# Patient Record
Sex: FEMALE | URBAN - METROPOLITAN AREA
[De-identification: names, ages, dates, MRNs, and addresses within clinical notes are randomized per-mention and may not be internally consistent; named-entity substitution may affect disease eponyms.]

---

## 2020-03-28 ENCOUNTER — HOSPITAL ENCOUNTER (OUTPATIENT)
Age: 60
Setting detail: SPECIMEN
Discharge: HOME | End: 2020-03-28
Payer: COMMERCIAL

## 2020-03-28 ENCOUNTER — OFFICE VISIT (OUTPATIENT)
Dept: PRIMARY CARE CLINIC | Age: 60
End: 2020-03-28
Payer: COMMERCIAL

## 2020-03-28 VITALS
HEIGHT: 65 IN | BODY MASS INDEX: 28.32 KG/M2 | WEIGHT: 170 LBS | DIASTOLIC BLOOD PRESSURE: 86 MMHG | OXYGEN SATURATION: 95 % | SYSTOLIC BLOOD PRESSURE: 147 MMHG | HEART RATE: 113 BPM | TEMPERATURE: 99.2 F

## 2020-03-28 LAB
INFLUENZA A ANTIBODY: NEGATIVE
INFLUENZA B ANTIBODY: NEGATIVE

## 2020-03-28 PROCEDURE — 99213 OFFICE O/P EST LOW 20 MIN: CPT | Performed by: FAMILY MEDICINE

## 2020-03-28 PROCEDURE — 87804 INFLUENZA ASSAY W/OPTIC: CPT | Performed by: FAMILY MEDICINE

## 2020-03-28 RX ORDER — GABAPENTIN 300 MG/1
300 CAPSULE ORAL 2 TIMES DAILY
COMMUNITY
Start: 2018-03-27 | End: 2020-03-28 | Stop reason: ALTCHOICE

## 2020-03-28 RX ORDER — SIMVASTATIN 20 MG
20 TABLET ORAL NIGHTLY
COMMUNITY
Start: 2018-06-01

## 2020-03-28 RX ORDER — UBIDECARENONE 100 MG
CAPSULE ORAL
COMMUNITY

## 2020-03-28 RX ORDER — FLUTICASONE PROPIONATE 50 MCG
SPRAY, SUSPENSION (ML) NASAL
COMMUNITY
Start: 2018-03-23

## 2020-03-28 RX ORDER — OMEPRAZOLE 10 MG/1
10 CAPSULE, DELAYED RELEASE ORAL DAILY
COMMUNITY

## 2020-03-28 ASSESSMENT — ENCOUNTER SYMPTOMS
ABDOMINAL PAIN: 1
SINUS PAIN: 1
SINUS PRESSURE: 1
SORE THROAT: 1
RHINORRHEA: 0

## 2020-03-28 NOTE — PROGRESS NOTES
[facility]  [facility]  [address] [place]  [address] [address]  Dept: [phone]  Dept Fax: [phone]    Hai Sanabria is a 62 y.o. female who presents today for her medical conditions/complaints as noted below. Hai Sanabria is c/o of   Chief Complaint   Patient presents with    Generalized Body Aches    Chills    Headache         HPI:     2 days ago the patient felt very tired, run down and having myalgias. She also complains of fevers and chills. The symptoms continued and she ezra had to take a nap between patients and felt really tired. The patient does have a history of acid reflux and states the symptoms are usually chest pressure. She currently does note that sensation of feeling short of breath and having some chest pressure. She states in the past when she's had chest pressure it was worked up for cardiac origin and the cardiac testing came back negative. She describes this sickness as feeling like she was \"hit by a truck\". She does complain of intermittent elevation of her heart rate. Other symptoms include throat feeling swollen, ear pressure    The patient denies any cough, ear pain, sputum production. No results found for: LABA1C          ( goal A1Cis < 7)   No results found for: LABMICR  No results found for: LDLCHOLESTEROL, LDLCALC    (goal LDL is <100)   No results found for: AST, ALT, BUN  BP Readings from Last 3 Encounters:   03/28/20 (!) 147/86          (goal 120/80)    No past medical history on file. No past surgical history on file. No family history on file.     Social History     Tobacco Use    Smoking status: Never Smoker    Smokeless tobacco: Never Used   Substance Use Topics    Alcohol use: Not on file      Current Outpatient Medications   Medication Sig Dispense Refill    fluticasone (FLONASE) 50 MCG/ACT nasal spray by Nasal route      Meloxicam (MOBIC PO) Take 15 mg by mouth      simvastatin (ZOCOR) work note. Patient was advised to go to ER if she develops any worsening shortness of breath or chest pressure. Chest pressure-d/w patient the possibility that her symptoms could be related to cardiac pathology. Patient declines that being a possibiity. I still advised patient that she should go to ER if the chest pressure returns or she develops shortness of breath. The patient verbalized understanding and agreed to go to ER if pressure returns  Return if symptoms worsen or fail to improve. Orders Placed This Encounter   Procedures    COVID-19     Standing Status:   Future     Standing Expiration Date:   3/28/2021     Scheduling Instructions:      Use this test only if unable to order EMERGENT DISEASE PANEL/patient does not meet PUI criteria BUT patient is at possible risk for COVID-19. Due to limited availability of the test, tests may be prioritized and may not be available or completed. 1) Nasopharyngeal swab* AND oropharyngeal swab. (Place both swabs in one collection tube). Also acceptable: nasopharyngeal swab. 2) Print and instruct patient to adhere to [facility] home isolation program. (Link Above)            3) Set up or refer patient for a monitoring program.            4) Have patient sign up for and leverage [employer] (if not previously done).  POCT Influenza A/B     No orders of the defined types were placed in this encounter. Patient given educational materials - see patient instructions. Discussed use, benefit, and side effects of prescribed medications. All patientquestions answered. Pt voiced understanding. Reviewed health maintenance. Instructedto continue current medications, diet and exercise. Patient agreed with treatmentplan. Follow up as directed.      Electronically signed by Neal Diaz MD on 3/29/2020 at 8:21 AM

## 2020-03-28 NOTE — PATIENT INSTRUCTIONS
Preventing the Spread of Coronavirus Disease 2019 in Homes and Residential Communities   For the most recent information go to [email or web address]    Prevention steps for People with confirmed or suspected COVID-19 (including persons under investigation) who do not need to be hospitalized  and   People with confirmed COVID-19 who were hospitalized and determined to be medically stable to go home    Your healthcare provider and public health staff will evaluate whether you can be cared for at home. If it is determined that you do not need to be hospitalized and can be isolated at home, you will be monitored by staff from your local or state health department. You should follow the prevention steps below until a healthcare provider or local or state health department says you can return to your normal activities. Stay home except to get medical care  People who are mildly ill with COVID-19 are able to isolate at home during their illness. You should restrict activities outside your home, except for getting medical care. Do not go to work, school, or public areas. Avoid using public transportation, ride-sharing, or taxis. Separate yourself from other people and animals in your home  People: As much as possible, you should stay in a specific room and away from other people in your home. Also, you should use a separate bathroom, if available. Animals: You should restrict contact with pets and other animals while you are sick with COVID-19, just like you would around other people. Although there have not been reports of pets or other animals becoming sick with COVID-19, it is still recommended that people sick with COVID-19 limit contact with animals until more information is known about the virus. When possible, have another member of your household care for your animals while you are sick.  If you are sick with COVID-19, avoid contact with your pet, including

## 2020-03-29 ASSESSMENT — ENCOUNTER SYMPTOMS: SHORTNESS OF BREATH: 1

## 2020-04-05 LAB — SARS-COV-2, NAA: NOT DETECTED

## 2024-12-17 ENCOUNTER — TELEPHONE (OUTPATIENT)
Age: 64
End: 2024-12-17

## 2024-12-17 NOTE — TELEPHONE ENCOUNTER
12/17/2024 - left voicemail message for patient to call back and schedule a new patient appointment with either provider (cervical disc disease, left arm weakness, lumbar radiculopathy, right sided low back pain with right sided sciatica) MRI Lumbar / Cervical 11/27/24 @ [facility] - referral in folder

## 2025-02-10 ENCOUNTER — OFFICE VISIT (OUTPATIENT)
Age: 65
End: 2025-02-10
Payer: MEDICARE

## 2025-02-10 VITALS
HEIGHT: 65 IN | HEART RATE: 113 BPM | BODY MASS INDEX: 26.66 KG/M2 | WEIGHT: 160 LBS | SYSTOLIC BLOOD PRESSURE: 125 MMHG | DIASTOLIC BLOOD PRESSURE: 87 MMHG

## 2025-02-10 DIAGNOSIS — G56.03 BILATERAL CARPAL TUNNEL SYNDROME: ICD-10-CM

## 2025-02-10 DIAGNOSIS — M48.02 FORAMINAL STENOSIS OF CERVICAL REGION: Primary | ICD-10-CM

## 2025-02-10 PROCEDURE — 1123F ACP DISCUSS/DSCN MKR DOCD: CPT | Performed by: NEUROLOGICAL SURGERY

## 2025-02-10 PROCEDURE — G8427 DOCREV CUR MEDS BY ELIG CLIN: HCPCS | Performed by: NEUROLOGICAL SURGERY

## 2025-02-10 PROCEDURE — G8419 CALC BMI OUT NRM PARAM NOF/U: HCPCS | Performed by: NEUROLOGICAL SURGERY

## 2025-02-10 PROCEDURE — 1036F TOBACCO NON-USER: CPT | Performed by: NEUROLOGICAL SURGERY

## 2025-02-10 PROCEDURE — 99204 OFFICE O/P NEW MOD 45 MIN: CPT | Performed by: NEUROLOGICAL SURGERY

## 2025-02-10 PROCEDURE — 3017F COLORECTAL CA SCREEN DOC REV: CPT | Performed by: NEUROLOGICAL SURGERY

## 2025-02-10 PROCEDURE — 1090F PRES/ABSN URINE INCON ASSESS: CPT | Performed by: NEUROLOGICAL SURGERY

## 2025-02-10 PROCEDURE — 1159F MED LIST DOCD IN RCRD: CPT | Performed by: NEUROLOGICAL SURGERY

## 2025-02-10 PROCEDURE — G8400 PT W/DXA NO RESULTS DOC: HCPCS | Performed by: NEUROLOGICAL SURGERY

## 2025-02-10 RX ORDER — PREGABALIN 25 MG/1
75 CAPSULE ORAL 2 TIMES DAILY
COMMUNITY

## 2025-02-10 RX ORDER — DULOXETIN HYDROCHLORIDE 30 MG/1
30 CAPSULE, DELAYED RELEASE ORAL DAILY
COMMUNITY

## 2025-02-10 NOTE — PROGRESS NOTES
physical therapy have been helpful.  Thus for the time being she would like to maintain a conservative stance.  If her symptoms were to worsen or recrudesce, we could then give further consideration to additional treatment including the option of surgical invention at that time.  For the time being, the patient wished to maintain a conservative stance regarding her neck and carpal tunnel syndrome.    Followup: No follow-ups on file.    Prescriptions Ordered:  No orders of the defined types were placed in this encounter.       Orders Placed:  No orders of the defined types were placed in this encounter.      Electronically signed by Johny Tsang MD on 2/10/2025 at 8:44 AM    Please note that this chart was generated using voice recognition Dragon dictation software.  Although every effort was made to ensure the accuracy of this automated transcription, some errors in transcription may have occurred.

## 2025-02-12 ENCOUNTER — OFFICE VISIT (OUTPATIENT)
Age: 65
End: 2025-02-12
Payer: MEDICARE

## 2025-02-12 VITALS
BODY MASS INDEX: 26.66 KG/M2 | WEIGHT: 160 LBS | DIASTOLIC BLOOD PRESSURE: 84 MMHG | SYSTOLIC BLOOD PRESSURE: 130 MMHG | HEART RATE: 85 BPM | HEIGHT: 65 IN | RESPIRATION RATE: 14 BRPM

## 2025-02-12 DIAGNOSIS — M48.061 FORAMINAL STENOSIS OF LUMBAR REGION: Primary | ICD-10-CM

## 2025-02-12 DIAGNOSIS — M41.26 OTHER IDIOPATHIC SCOLIOSIS, LUMBAR REGION: ICD-10-CM

## 2025-02-12 DIAGNOSIS — M51.362 DEGENERATION OF INTERVERTEBRAL DISC OF LUMBAR REGION WITH DISCOGENIC BACK PAIN AND LOWER EXTREMITY PAIN: ICD-10-CM

## 2025-02-12 PROCEDURE — G8427 DOCREV CUR MEDS BY ELIG CLIN: HCPCS | Performed by: NEUROLOGICAL SURGERY

## 2025-02-12 PROCEDURE — 1090F PRES/ABSN URINE INCON ASSESS: CPT | Performed by: NEUROLOGICAL SURGERY

## 2025-02-12 PROCEDURE — 1036F TOBACCO NON-USER: CPT | Performed by: NEUROLOGICAL SURGERY

## 2025-02-12 PROCEDURE — G8419 CALC BMI OUT NRM PARAM NOF/U: HCPCS | Performed by: NEUROLOGICAL SURGERY

## 2025-02-12 PROCEDURE — 1159F MED LIST DOCD IN RCRD: CPT | Performed by: NEUROLOGICAL SURGERY

## 2025-02-12 PROCEDURE — 3017F COLORECTAL CA SCREEN DOC REV: CPT | Performed by: NEUROLOGICAL SURGERY

## 2025-02-12 PROCEDURE — G8400 PT W/DXA NO RESULTS DOC: HCPCS | Performed by: NEUROLOGICAL SURGERY

## 2025-02-12 PROCEDURE — 1123F ACP DISCUSS/DSCN MKR DOCD: CPT | Performed by: NEUROLOGICAL SURGERY

## 2025-02-12 PROCEDURE — 99214 OFFICE O/P EST MOD 30 MIN: CPT | Performed by: NEUROLOGICAL SURGERY

## 2025-02-12 NOTE — PROGRESS NOTES
[facility], [facility], [facility] NEUROSURGERY  [address], [address]  [facility] [place] [address]  Dept: [phone]  Dept Fax: [phone]     Patient:  Elayne Carey  YOB: 1960  Date: 2/12/25      Chief Complaint   Patient presents with    Follow-up     lumbar radiculopathy, right sided low back pain with right sided sciatica   MRI Lumbar 11/27/24 @ [facility] - patient seen on 2/10/25 for cervical              HPI:     Patient is a 64-year-old female who was recently seen in my office regarding her cervical spine.  She presents back to my office today with ongoing complaints of back as well as right lower extremity discomfort.  The patient indicates that her pain originates in her low back coursing into the right buttock and hip extending into both the anterior as well as lateral aspect of her thigh and calf region ending and intermittent numbness tingling of her right foot.  She does not describe any symptoms in left lower extremity.  Her symptoms have been quite prominent over the past 2 to 3 months.  She does not describe any difficulty with her bowel or bladder function.  She did recently undergo a injection through pain management to her lower back which she indicates has improved her current condition approximate 50%.    Examination today demonstrates a well-healed midline lumbar incision.  She has no reproducible discomfort to palpation across the lumbar spine or adjacent musculature.  Hips are nontender.  Danny's testing is negative bilaterally.  Strength and sensation are normal throughout both lower extremities.  Straight leg raising is -9 degrees.  Gait is steady.    A recent MRI of the lumbar spine performed November 27, 2024 is reviewed.  This study is most notable for marked degenerative change with loss of disc space height at the L3-4, L4-5 and L5-S1 to space levels.  Patient does have an associated scoliotic

## 2025-02-13 ENCOUNTER — TELEPHONE (OUTPATIENT)
Age: 65
End: 2025-02-13

## 2025-02-13 DIAGNOSIS — M41.26 OTHER IDIOPATHIC SCOLIOSIS, LUMBAR REGION: Primary | ICD-10-CM

## 2025-02-13 NOTE — TELEPHONE ENCOUNTER
Patient seen 2/12/25 and Dr Tsang wants to refer her to Dr Morton. Can you please add the referral and I will fax it to his office. Thanks

## 2025-05-23 ENCOUNTER — HOSPITAL ENCOUNTER (OUTPATIENT)
Age: 65
Setting detail: OUTPATIENT SURGERY
Discharge: HOME | End: 2025-05-23
Attending: ORTHOPAEDIC SURGERY | Admitting: ORTHOPAEDIC SURGERY
Payer: MEDICARE

## 2025-05-23 VITALS
TEMPERATURE: 98 F | OXYGEN SATURATION: 97 % | DIASTOLIC BLOOD PRESSURE: 78 MMHG | RESPIRATION RATE: 13 BRPM | HEART RATE: 60 BPM | WEIGHT: 169 LBS | BODY MASS INDEX: 28.85 KG/M2 | HEIGHT: 64 IN | SYSTOLIC BLOOD PRESSURE: 149 MMHG

## 2025-05-23 DIAGNOSIS — G89.18 POST-OP PAIN: Primary | ICD-10-CM

## 2025-05-23 PROCEDURE — 2500000003 HC RX 250 WO HCPCS: Performed by: ORTHOPAEDIC SURGERY

## 2025-05-23 PROCEDURE — 2709999900 HC NON-CHARGEABLE SUPPLY: Performed by: ORTHOPAEDIC SURGERY

## 2025-05-23 PROCEDURE — 7100000010 HC PHASE II RECOVERY - FIRST 15 MIN: Performed by: ORTHOPAEDIC SURGERY

## 2025-05-23 PROCEDURE — 3600000012 HC SURGERY LEVEL 2 ADDTL 15MIN: Performed by: ORTHOPAEDIC SURGERY

## 2025-05-23 PROCEDURE — 3600000002 HC SURGERY LEVEL 2 BASE: Performed by: ORTHOPAEDIC SURGERY

## 2025-05-23 RX ORDER — BUPIVACAINE HYDROCHLORIDE AND EPINEPHRINE 5; 5 MG/ML; UG/ML
INJECTION, SOLUTION PERINEURAL
Status: DISCONTINUED
Start: 2025-05-23 | End: 2025-05-23 | Stop reason: HOSPADM

## 2025-05-23 RX ORDER — TRAMADOL HYDROCHLORIDE 50 MG/1
50 TABLET ORAL EVERY 6 HOURS PRN
Qty: 28 TABLET | Refills: 0 | Status: SHIPPED | OUTPATIENT
Start: 2025-05-23 | End: 2025-05-30

## 2025-05-23 RX ORDER — BUPIVACAINE HCL/EPINEPHRINE 0.25-.0005
VIAL (ML) INJECTION PRN
Status: DISCONTINUED | OUTPATIENT
Start: 2025-05-23 | End: 2025-05-23 | Stop reason: ALTCHOICE

## 2025-05-23 ASSESSMENT — PAIN - FUNCTIONAL ASSESSMENT
PAIN_FUNCTIONAL_ASSESSMENT: NONE - DENIES PAIN
PAIN_FUNCTIONAL_ASSESSMENT: 0-10

## 2025-05-23 NOTE — H&P
Surgical History and Physical Exam    Reason for surgery:  The patient is a 64 y.o. female presenting for left carpal tunnel release.    Past Medical History:    Past Medical History:   Diagnosis Date    GERD (gastroesophageal reflux disease)     Hyperlipidemia      Past Surgical History:    Past Surgical History:   Procedure Laterality Date    LAMINECTOMY AND MICRODISCECTOMY LUMBAR SPINE  2018     Medications Prior to Admission:   Prior to Admission medications    Medication Sig Start Date End Date Taking? Authorizing Provider   DULoxetine (CYMBALTA) 30 MG extended release capsule Take 1 capsule by mouth daily   Yes Evangelina Chavis MD   pregabalin (LYRICA) 25 MG capsule Take 3 capsules by mouth 2 times daily.   Yes Evangelina Chavis MD   simvastatin (ZOCOR) 20 MG tablet 1 tablet nightly 6/1/18  Yes Evangelina Chavis MD   Multiple Vitamins-Minerals (MULTIVITAL PO) Take by mouth   Yes Evangelina Chavis MD   vitamin D (D3-1000) 25 MCG (1000 UT) CAPS Take by mouth   Yes Evangelina Chavis MD   omeprazole (PRILOSEC) 10 MG delayed release capsule Take 1 capsule by mouth daily   Yes Evangelina Chavis MD   Acetaminophen (TYLENOL 8 HOUR PO) Take by mouth   Yes Evangelina Chavis MD   Omega-3 Fatty Acids (FISH OIL PO) Take by mouth   Yes Evangelina Chavis MD   fluticasone (FLONASE) 50 MCG/ACT nasal spray by Nasal route 3/23/18   Evangelina Chavis MD   Meloxicam (MOBIC PO) Take 15 mg by mouth 3/23/18   Evangelina Chavis MD     Allergies:    Patient has no known allergies.  Social History:   Social History     Socioeconomic History    Marital status: Unknown     Spouse name: None    Number of children: None    Years of education: None    Highest education level: None   Tobacco Use    Smoking status: Never    Smokeless tobacco: Never   Substance and Sexual Activity    Alcohol use: Yes     Comment: socially    Drug use: Never     Social Drivers of Health     Financial Resource Strain: Low  history.    REVIEW OF SYSTEMS:  Constitutional: Negative for fever and chills.   HENT: Negative for congestion or drainage   Eyes: Negative for blurred vision and double vision.   Respiratory: Negative for cough, shortness of breath and wheezing.   Cardiovascular: Negative for chest pain and palpitations.   Gastrointestinal: Negative for nausea. Negative for vomiting.   Musculoskeletal: Positive for myalgias and joint pain.   Skin: Negative for itching and rash.   Neurological: Negative for dizziness, sensory change and headaches.   Psychiatric/Behavioral: Negative for depression and suicidal ideas.      PHYSICAL EXAM:  Blood pressure (!) 140/87, pulse 69, temperature 97.9 °F (36.6 °C), temperature source Temporal, resp. rate 14, height 1.626 m (5' 4\"), weight 76.7 kg (169 lb), SpO2 100%.  Gen: alert and oriented, NAD, cooperative  Head: normocephalic atraumatic   Cardiovascular: Regular rate, no dependent edema, distal pulses 2+  Respiratory: Chest symmetric, no accessory muscle use, normal respirations  MSK: motor and  sensation grossly intact    A/P: 64 y.o. female  was evaluated and after discussion surgical and non surgical options, the patient has decided to undergo The abovementioned. Consent obtained and in chart. All questions answered appropriately. Surgical risks including but not limited to: bleeding, blood clots, infection, damage to surrounding tissues/nerves/vessels, failure of fixation, failure of wounds to heal, loss of motion, stiffness, dislocation, postoperative pain, recurrence of symptoms, need for future surgery, risks of anesthesia, loss of limb and loss of life were all discussed with the patient. Knowing these risks, the patient wishes to proceed with surgery.   -Abx OCTOR  -Site marked, Consent in chart  -AC held  -NPO since midnight  -All question answered.    ________________________________  Camilo Ward D.O.  Orthopedic Surgery Resident, PGY-3  [facility]

## 2025-05-23 NOTE — OP NOTE
[address] [place] [address]                            OPERATIVE REPORT      PATIENT NAME: YSABEL LONG                : 1960  MED REC NO: [id number]                         ROOM: [facility]  ACCOUNT NO: [id number]                       ADMIT DATE: 2025  PROVIDER: Nicholas Morton MD      DATE OF PROCEDURE:  2025    SURGEON:  Nicholas Morton MD    ASSISTANT:  Camilo Ward D.O.    PREOPERATIVE DIAGNOSIS:  Left carpal tunnel syndrome.    POSTOPERATIVE DIAGNOSIS:  Left carpal tunnel syndrome.    PROCEDURE:  Left carpal tunnel release.    ANESTHESIA:  Local.    INDICATIONS:  The patient is a 64-year-old lady, numbness, tingling, pain across the left wrist, hand and fingers, with dysesthesia consistent with carpal tunnel syndrome.  She had failed a nonoperative treatment program and presents for surgical treatment.  The surgical procedure, risks, benefits, and complications were discussed with good comprehension and informed consent obtained.    DESCRIPTION OF PROCEDURE:  The patient was brought into the operating room, placed under appropriate general anesthesia, and transferred to operating room table in the supine position, arm at her side, and pneumatic tourniquet applied to the left upper arm but not inflated.  The hand was prepped with chlorhexidine, and a time-out performed.    Local anesthetic infiltrated along the left wrist, along the carpal tunnel to the ulnar side.  Once satisfactory level of anesthesia was obtained, we then made a scalpel incision, directly along the ring finger digit, along the skin crease.  Dissected down through the skin and subcutaneous tissues, palmar fascia opened, and exposed the transverse carpal ligament.  Once we had that exposed, we then released that sharply using loupe magnification, and then proximal and distal, used small tenotomies to open up further.  We  dissected the above volar to the transverse carpal ligament to the pump to make sure there were no crossing branches, and once identified clear, we then released it.    Bipolar cautery used for some small little subcutaneous bleeders.  Once that was done, wound was irrigated.  Nerve was now well decompressed.    We then closed the skin using 4-0 Prolene sutures, and combination of simple and horizontal mattress suture technique.  Once that was secured, we then placed our dressing across it, padding applied and secured with an Ace.    The patient was then transferred to the bed, awoke, and brought to recovery room in satisfactory condition.          NICHOLAS MORTON MD      D:  05/23/2025 14:27:07     T:  05/23/2025 15:03:08     TGA/PAIGES  Job #:  [id number]     Doc#:  [id number]    CC:   Nicholas Morton MD

## 2025-05-23 NOTE — BRIEF OP NOTE
Brief Postoperative Note      Patient: Elayne Carey  YOB: 1960  MRN: [id number]    Date of Procedure: 5/23/2025    Pre-Op Diagnosis Codes:      * Left carpal tunnel syndrome [G56.02]    Post-Op Diagnosis: Same       Procedure(s):  LEFT CARPAL TUNNEL RELEASE    Surgeon(s):  Nicholas Morton MD    Assistant:  Resident: Camilo Ward DO    Anesthesia: Local    Estimated Blood Loss (mL): Minimal    Complications: None    Specimens:   * No specimens in log *    Implants:  * No implants in log *      Drains: * No LDAs found *    Findings:  Infection Present At Time Of Surgery (PATOS) (choose all levels that have infection present):  No infection present  Other Findings: Left carpal tunnel syndrome; see op note    Electronically signed by Camilo Ward DO on 5/23/2025 at 2:12 PM

## 2025-05-23 NOTE — DISCHARGE INSTRUCTIONS
Leave the surgical bandage in place for 2 days.  Then you can remove it and shower and replace with a new bandage or a large Band-Aid each day.  It is okay and encouraged to move the fingers throughout the day.  It is okay to use that hand for activities of daily living.  Avoid heavy gripping or lifting until seen in the office.    Call your doctor now or seek immediate medical care if:     You have pain that does not get better after you take pain medicine.   You have a fever over 101°F.   You have chills   You have signs of infection, such as:   Increased pain, swelling, warmth, or redness.   Red streaks leading from the incision.   Pus draining from the incision.     -Ice (20 minutes on and off 1 hour) as needed for swelling/pain.  -Drink plenty of fluids.  -Follow up with Dr. Morton in his office in 10-14 days after surgery/discharge. Call [phone] to schedule.

## 2025-06-19 ENCOUNTER — HOSPITAL ENCOUNTER (OUTPATIENT)
Age: 65
Setting detail: OUTPATIENT SURGERY
Discharge: HOME | End: 2025-06-19
Attending: ORTHOPAEDIC SURGERY | Admitting: ORTHOPAEDIC SURGERY
Payer: MEDICARE

## 2025-06-19 VITALS
OXYGEN SATURATION: 97 % | TEMPERATURE: 98.1 F | HEART RATE: 58 BPM | HEIGHT: 64 IN | WEIGHT: 167.4 LBS | SYSTOLIC BLOOD PRESSURE: 130 MMHG | BODY MASS INDEX: 28.58 KG/M2 | DIASTOLIC BLOOD PRESSURE: 68 MMHG

## 2025-06-19 DIAGNOSIS — G89.18 POST-OP PAIN: Primary | ICD-10-CM

## 2025-06-19 PROCEDURE — 3600000002 HC SURGERY LEVEL 2 BASE: Performed by: ORTHOPAEDIC SURGERY

## 2025-06-19 PROCEDURE — 2500000003 HC RX 250 WO HCPCS: Performed by: ORTHOPAEDIC SURGERY

## 2025-06-19 PROCEDURE — 3600000012 HC SURGERY LEVEL 2 ADDTL 15MIN: Performed by: ORTHOPAEDIC SURGERY

## 2025-06-19 PROCEDURE — 7100000010 HC PHASE II RECOVERY - FIRST 15 MIN: Performed by: ORTHOPAEDIC SURGERY

## 2025-06-19 PROCEDURE — 2709999900 HC NON-CHARGEABLE SUPPLY: Performed by: ORTHOPAEDIC SURGERY

## 2025-06-19 RX ORDER — FERROUS SULFATE 325(65) MG
325 TABLET, DELAYED RELEASE (ENTERIC COATED) ORAL DAILY
COMMUNITY

## 2025-06-19 RX ORDER — BUPIVACAINE HCL/EPINEPHRINE 0.5-1:200K
VIAL (ML) INJECTION
Status: DISCONTINUED
Start: 2025-06-19 | End: 2025-06-19 | Stop reason: HOSPADM

## 2025-06-19 RX ORDER — ONDANSETRON 4 MG/1
4 TABLET, ORALLY DISINTEGRATING ORAL 3 TIMES DAILY PRN
Qty: 21 TABLET | Refills: 0 | Status: SHIPPED | OUTPATIENT
Start: 2025-06-19

## 2025-06-19 RX ORDER — ROSUVASTATIN CALCIUM 5 MG/1
5 TABLET, COATED ORAL NIGHTLY
COMMUNITY
Start: 2025-03-03

## 2025-06-19 RX ORDER — HYDROCODONE BITARTRATE AND ACETAMINOPHEN 5; 325 MG/1; MG/1
1 TABLET ORAL EVERY 4 HOURS PRN
Qty: 18 TABLET | Refills: 0 | Status: SHIPPED | OUTPATIENT
Start: 2025-06-19 | End: 2025-06-22

## 2025-06-19 RX ORDER — METHYLPREDNISOLONE ACETATE 40 MG/ML
INJECTION, SUSPENSION INTRA-ARTICULAR; INTRALESIONAL; INTRAMUSCULAR; SOFT TISSUE
Status: DISCONTINUED
Start: 2025-06-19 | End: 2025-06-19 | Stop reason: HOSPADM

## 2025-06-19 RX ORDER — DOCUSATE SODIUM 100 MG/1
100 CAPSULE, LIQUID FILLED ORAL 2 TIMES DAILY
Qty: 60 CAPSULE | Refills: 0 | Status: SHIPPED | OUTPATIENT
Start: 2025-06-19 | End: 2025-07-19

## 2025-06-19 RX ORDER — BUPIVACAINE HCL/EPINEPHRINE 0.25-.0005
VIAL (ML) INJECTION PRN
Status: DISCONTINUED | OUTPATIENT
Start: 2025-06-19 | End: 2025-06-19 | Stop reason: ALTCHOICE

## 2025-06-19 ASSESSMENT — PAIN - FUNCTIONAL ASSESSMENT: PAIN_FUNCTIONAL_ASSESSMENT: NONE - DENIES PAIN

## 2025-06-19 NOTE — OP NOTE
[address] [address]                            OPERATIVE REPORT      PATIENT NAME: YSABEL LONG                : 1960  MED REC NO: [id number]                         ROOM: [facility]  ACCOUNT NO: [id number]                       ADMIT DATE: 2025  PROVIDER: Nicholas Morton MD      DATE OF PROCEDURE:  2025    SURGEON:  Nicholas Morton MD    ASSISTANT:  Camilo Ward DO.    PREOPERATIVE DIAGNOSES:    1. Right carpal tunnel syndrome.  2. Right wrist cyst.    POSTOPERATIVE DIAGNOSES:    1. Right carpal tunnel syndrome.  2. Right wrist cyst.    PROCEDURES:    1. Aspiration of right wrist cyst and injection of Depo-Medrol.  2. Right carpal tunnel release.    ANESTHESIA:  Local.    INDICATIONS:  The patient is a 64-year-old lady, right carpal tunnel syndrome, with numbness, tingling, and functional deficit, who presents for treatment.  The surgical procedure, risks, benefits, and complications were discussed with good comprehension and informed consent obtained.    DESCRIPTION OF PROCEDURE:  The patient was brought into the operating room, placed under local anesthesia after Betadine prep, and time-out performed.  Once she had good level of anesthesia, she also had at that time a cyst, which we then infiltrated with Marcaine.  The hand was then prepped and draped in the usual fashion with the chlorhexidine.    Once dried, we then positioned the wrist, and did our right carpal tunnel release sharply through the skin, exposed with the palmar fascia, opened that, and then exposed the transverse carpal ligament.  We then released under loupe magnification, with scalpel the transverse carpal ligament at the mid segment.  We then went above the ligament just proximally, make sure no crossing nerve, and then released that with a tenotomy.  We then went distal, again tracing above initially with our

## 2025-06-19 NOTE — DISCHARGE INSTRUCTIONS
Leave the surgical bandage in place for 2 days.  Then you can remove it and shower and replace with a new bandage or a large Band-Aid each day.  It is okay and encouraged to move the fingers throughout the day.  It is okay to use that hand for activities of daily living.  Avoid heavy gripping or lifting until seen in the office.    Ok to shower and let water/soap run over the incision. Do not soak in any water yet, until your incision has healed with mature scar at approximately 5 weeks after surgery.     Call your doctor now or seek immediate medical care if:     You have pain that does not get better after you take pain medicine.   You have a fever over 101°F.   You have chills   You have signs of infection, such as:   Increased pain, swelling, warmth, or redness.   Red streaks leading from the incision.   Pus draining from the incision.      -Follow up with Dr. Morton in his office in 10-14 days after surgery/discharge. Call [phone] to schedule.

## 2025-06-19 NOTE — H&P
Surgical History and Physical Exam    Reason for surgery:  The patient is a 64 y.o. female presenting for right carpal tunnel release.    Past Medical History:    Past Medical History:   Diagnosis Date    GERD (gastroesophageal reflux disease)     Hyperlipidemia      Past Surgical History:    Past Surgical History:   Procedure Laterality Date    CARPAL TUNNEL RELEASE Left 5/23/2025    LEFT CARPAL TUNNEL RELEASE performed by Nicholas Morton MD at [facility] OR    LAMINECTOMY AND MICRODISCECTOMY LUMBAR SPINE  2018     Medications Prior to Admission:   Prior to Admission medications    Medication Sig Start Date End Date Taking? Authorizing Provider   rosuvastatin (CRESTOR) 5 MG tablet Take 1 tablet by mouth nightly 3/3/25  Yes Evangelina Chavis MD   ferrous sulfate (FE TABS 325) 325 (65 Fe) MG EC tablet Take 1 tablet by mouth daily   Yes Evangelina Chavis MD   DULoxetine (CYMBALTA) 30 MG extended release capsule Take 1 capsule by mouth daily   Yes Evangelina Chavis MD   pregabalin (LYRICA) 25 MG capsule Take 3 capsules by mouth 2 times daily.   Yes Evangelina Chavis MD   Meloxicam (MOBIC PO) Take 15 mg by mouth 3/23/18  Yes Evangelina Chavis MD   Multiple Vitamins-Minerals (MULTIVITAL PO) Take by mouth   Yes Evangelina Chavis MD   vitamin D (D3-1000) 25 MCG (1000 UT) CAPS Take by mouth   Yes Evangelina Chavis MD   omeprazole (PRILOSEC) 10 MG delayed release capsule Take 1 capsule by mouth daily   Yes Evangelina Chavis MD   Acetaminophen (TYLENOL 8 HOUR PO) Take by mouth   Yes Evangelina Chavis MD   Omega-3 Fatty Acids (FISH OIL PO) Take by mouth   Yes Evangelina Chavis MD   fluticasone (FLONASE) 50 MCG/ACT nasal spray by Nasal route 3/23/18   Evangelina Chavis MD   simvastatin (ZOCOR) 20 MG tablet 1 tablet nightly  Patient not taking: Reported on 6/19/2025 6/1/18   Evangelina Chavis MD     Allergies:    Patient has no known allergies.  Social History:   Social  surgery, risks of anesthesia, loss of limb and loss of life were all discussed with the patient. Knowing these risks, the patient wishes to proceed with surgery.   -Abx OCTOR  -Site marked, Consent in chart  -AC held  -NPO since midnight  -All question answered.    ________________________________  Camilo Ward D.O.  Orthopedic Surgery Resident, PGY-3  [place], [place]

## 2025-06-19 NOTE — BRIEF OP NOTE
Brief Postoperative Note      Patient: Elayne Carey  YOB: 1960  MRN: [id number]    Date of Procedure: 6/19/2025    Pre-Op Diagnosis Codes:      * Carpal tunnel syndrome on right [G56.01]    Post-Op Diagnosis:   Right carpal tunnel syndrome  Right wrist ganglion cyst        Procedure(s):  Right carpal tunnel release  Right wrist ganglion cyst aspiration with depomedrol steroid injection    Surgeon(s):  Nicholas Morton MD    Assistant:  Resident: Camilo Ward DO    Anesthesia: Local    Estimated Blood Loss (mL): Minimal    Complications: None    Specimens:   * No specimens in log *    Implants:  * No implants in log *      Drains: * No LDAs found *    Findings:  Infection Present At Time Of Surgery (PATOS) (choose all levels that have infection present):  No infection present  Other Findings: Right carpal tunnel syndrome; Right dorsal ulnar ganglion cyst; See Op Note    Electronically signed by Camilo Ward DO on 6/19/2025 at 10:35 AM

## (undated) DEVICE — MHPB HAND AND FOOT PACK: Brand: MEDLINE INDUSTRIES, INC.

## (undated) DEVICE — DISPOSABLE TOURNIQUET CUFF SINGLE BLADDER, SINGLE PORT AND QUICK CONNECT CONNECTOR: Brand: COLOR CUFF

## (undated) DEVICE — GLOVE ORANGE PI 8   MSG9080

## (undated) DEVICE — APPLICATOR MEDICATED 26 CC SOLUTION HI LT ORNG CHLORAPREP

## (undated) DEVICE — BANDAGE GZ W2XL75IN ST RAYON POLY CNFRM STRTCH LTWT

## (undated) DEVICE — CORD,CAUTERY,BIPOLAR,STERILE: Brand: MEDLINE

## (undated) DEVICE — SUTURE PROL SZ 3-0 L18IN NONABSORBABLE BLU L19MM PS-2 3/8 8687H

## (undated) DEVICE — SOLUTION IRRIG 1000ML 09% SOD CHL USP PIC PLAS CONTAINER

## (undated) DEVICE — SYRINGE MED 10ML LUERLOCK TIP W/O SFTY DISP

## (undated) DEVICE — BNDG,ELSTC,MATRIX,STRL,2"X5YD,LF,HOOK&LP: Brand: MEDLINE

## (undated) DEVICE — STERILE POLYISOPRENE POWDER-FREE SURGICAL GLOVES WITH EMOLLIENT COATING: Brand: PROTEXIS